# Patient Record
Sex: FEMALE | Race: WHITE | Employment: UNEMPLOYED | URBAN - METROPOLITAN AREA
[De-identification: names, ages, dates, MRNs, and addresses within clinical notes are randomized per-mention and may not be internally consistent; named-entity substitution may affect disease eponyms.]

---

## 2021-01-14 PROBLEM — O99.210 OBESITY IN PREGNANCY: Status: ACTIVE | Noted: 2021-01-14

## 2021-01-14 PROBLEM — O34.219 HISTORY OF CESAREAN SECTION COMPLICATING PREGNANCY: Status: ACTIVE | Noted: 2021-01-14

## 2021-01-14 PROBLEM — O09.529 ANTEPARTUM MULTIGRAVIDA OF ADVANCED MATERNAL AGE: Status: ACTIVE | Noted: 2021-01-14

## 2021-01-20 PROBLEM — O26.899 RH NEGATIVE STATE IN ANTEPARTUM PERIOD: Status: ACTIVE | Noted: 2021-01-20

## 2021-01-20 PROBLEM — Z67.91 RH NEGATIVE STATE IN ANTEPARTUM PERIOD: Status: ACTIVE | Noted: 2021-01-20

## 2021-03-15 PROBLEM — S92.352A CLOSED DISPLACED FRACTURE OF FIFTH METATARSAL BONE OF LEFT FOOT: Status: ACTIVE | Noted: 2021-03-15

## 2021-03-25 PROBLEM — Z34.92 PRENATAL CARE IN SECOND TRIMESTER: Status: ACTIVE | Noted: 2021-03-25

## 2021-03-25 PROBLEM — O43.192 MARGINAL INSERTION OF UMBILICAL CORD AFFECTING MANAGEMENT OF MOTHER IN SECOND TRIMESTER: Status: ACTIVE | Noted: 2021-03-25

## 2021-03-25 PROBLEM — O44.40 LOW-LYING PLACENTA: Status: ACTIVE | Noted: 2021-03-25

## 2021-04-28 PROBLEM — M72.2 PLANTAR FASCIA SYNDROME: Status: ACTIVE | Noted: 2021-04-28

## 2021-07-14 PROBLEM — O43.192 MARGINAL INSERTION OF UMBILICAL CORD AFFECTING MANAGEMENT OF MOTHER IN SECOND TRIMESTER: Status: RESOLVED | Noted: 2021-03-25 | Resolved: 2021-07-14

## 2021-07-14 PROBLEM — O44.40 LOW-LYING PLACENTA: Status: RESOLVED | Noted: 2021-03-25 | Resolved: 2021-07-14

## 2021-07-14 PROBLEM — Z34.93 PRENATAL CARE IN THIRD TRIMESTER: Status: RESOLVED | Noted: 2021-03-25 | Resolved: 2021-07-14

## 2021-07-14 PROBLEM — Z34.93 PRENATAL CARE IN THIRD TRIMESTER: Status: ACTIVE | Noted: 2021-03-25

## 2021-07-14 PROBLEM — Z34.90 PRENATAL CARE, ANTEPARTUM: Status: ACTIVE | Noted: 2021-07-14

## 2021-07-21 PROBLEM — O98.819 GROUP B STREPTOCOCCAL INFECTION DURING PREGNANCY: Status: ACTIVE | Noted: 2021-07-21

## 2021-07-21 PROBLEM — B95.1 GROUP B STREPTOCOCCAL INFECTION DURING PREGNANCY: Status: ACTIVE | Noted: 2021-07-21

## 2021-07-27 PROBLEM — O42.90 AMNIOTIC FLUID LEAKING: Status: ACTIVE | Noted: 2021-07-27

## 2021-07-29 PROBLEM — R03.0 TRANSIENT HYPERTENSION: Status: ACTIVE | Noted: 2021-07-29

## 2021-07-29 PROBLEM — Z30.2 REQUEST FOR STERILIZATION: Status: ACTIVE | Noted: 2021-07-29

## 2021-08-02 PROBLEM — O34.219 ENCOUNTER FOR MATERNAL CARE FOR SCAR FROM REPEAT CESAREAN DELIVERY: Status: ACTIVE | Noted: 2021-08-02

## 2021-09-08 PROBLEM — Z34.90 PRENATAL CARE, ANTEPARTUM: Status: RESOLVED | Noted: 2021-07-14 | Resolved: 2021-09-08

## 2021-09-08 PROBLEM — B95.1 GROUP B STREPTOCOCCAL INFECTION DURING PREGNANCY: Status: RESOLVED | Noted: 2021-07-21 | Resolved: 2021-09-08

## 2021-09-08 PROBLEM — Z67.91 RH NEGATIVE STATE IN ANTEPARTUM PERIOD: Status: RESOLVED | Noted: 2021-01-20 | Resolved: 2021-09-08

## 2021-09-08 PROBLEM — O34.219 ENCOUNTER FOR MATERNAL CARE FOR SCAR FROM REPEAT CESAREAN DELIVERY: Status: RESOLVED | Noted: 2021-08-02 | Resolved: 2021-09-08

## 2021-09-08 PROBLEM — O09.529 ANTEPARTUM MULTIGRAVIDA OF ADVANCED MATERNAL AGE: Status: RESOLVED | Noted: 2021-01-14 | Resolved: 2021-09-08

## 2021-09-08 PROBLEM — O26.899 RH NEGATIVE STATE IN ANTEPARTUM PERIOD: Status: RESOLVED | Noted: 2021-01-20 | Resolved: 2021-09-08

## 2021-09-08 PROBLEM — O98.819 GROUP B STREPTOCOCCAL INFECTION DURING PREGNANCY: Status: RESOLVED | Noted: 2021-07-21 | Resolved: 2021-09-08

## 2021-09-08 PROBLEM — O42.90 AMNIOTIC FLUID LEAKING: Status: RESOLVED | Noted: 2021-07-27 | Resolved: 2021-09-08

## 2021-09-08 PROBLEM — O34.219 HISTORY OF CESAREAN SECTION COMPLICATING PREGNANCY: Status: RESOLVED | Noted: 2021-01-14 | Resolved: 2021-09-08

## 2021-09-08 PROBLEM — O99.210 OBESITY IN PREGNANCY: Status: RESOLVED | Noted: 2021-01-14 | Resolved: 2021-09-08

## 2022-01-22 ENCOUNTER — E-VISIT (OUTPATIENT)
Dept: PRIMARY CARE CLINIC | Age: 37
End: 2022-01-22

## 2022-01-22 DIAGNOSIS — R05.9 COUGH: ICD-10-CM

## 2022-01-22 DIAGNOSIS — H92.09 OTALGIA, UNSPECIFIED LATERALITY: ICD-10-CM

## 2022-01-22 DIAGNOSIS — J01.90 ACUTE NON-RECURRENT SINUSITIS, UNSPECIFIED LOCATION: Primary | ICD-10-CM

## 2022-01-22 PROCEDURE — 99422 OL DIG E/M SVC 11-20 MIN: CPT | Performed by: NURSE PRACTITIONER

## 2022-01-22 RX ORDER — AMOXICILLIN AND CLAVULANATE POTASSIUM 875; 125 MG/1; MG/1
1 TABLET, FILM COATED ORAL 2 TIMES DAILY
Qty: 20 TABLET | Refills: 0 | Status: SHIPPED | OUTPATIENT
Start: 2022-01-22 | End: 2022-02-01

## 2022-01-22 ASSESSMENT — LIFESTYLE VARIABLES: SMOKING_STATUS: NO, I'VE NEVER SMOKED

## 2022-01-22 NOTE — PROGRESS NOTES
Reviewed questionnaire  Reviewed previous encounters, medications, allergies and history     Dx sinusitis, cough, otalgia     Plan  rx for Augmentin     1. Water: Drink 1/2 of body weight (lb) in ounces,  Up to 90 Ounces of water per day. This will loosen mucus in the head and chest & improve the weak feeling of dehydration, allow the body to get germ fighting resources to the infection. Half can be juice or sugar free powerade or garorate. Don't count drinks with caffeine or carbonation. Infants can have Pedialyte liquid or freezer pops. Avoid salt if you have high Blood Pressure, swelling in the feet or ankles or have heart problems. 2. Humidity: Humidify the air to 35-50% ( or until the windows fog over slightly).  Summer use of an air conditioner turned down too far and can result in dry air. Can use a humidifier, vaporizer, boil water on the stove or put a coffee can full of water on the heater vents. This will loosen mucus from infections and allergies. 3. Sleep: Get 8-10 hours a night and rest during the evening after work or school. If you have trouble sleeping, adults can take Melatonin 5mg up to 2 tabs at bedtime ( not for children or pregnant women). If Mono is suspected then sleep during 9PM to 9AM time span (if possible.)  4. Cough: Take cough medicines with Guaifenesin ( to loosen chest or head congestion) and Dextromethorphan ( to decrease excess cough). Robitussin D.M. Syrup every 4-6 hrs or Mucinex D. M. pills twice a day. Use the pediatric formulations for children over 6 months making sure they are alcohol & sugar free for children, pregnant women, and diabetics. 5. Pain And Fevers: Take Acetaminophen ( Tylenol) for fevers, aches, and headaches. 2-500 mg every 8 hours for adults. Appropriate doses at bedtime for children may help them sleep better. Ibuprofen may be used if not pregnant, but should be given with food to avoid nausea.  Avoid Ibuprofen if you have high blood pressure, CHF, or kidney problems. 6.Gargle: (DAY ONE OF SYMPTOMS) Gargle in the back of the throat with the head tilted back and to the sides with a strong mouthwash  ( Listerine or Scope) after meals and at bedtime at least 4 -5 times a day. This helps kill bacteria and viruses in the back of the throat and will shorten the duration and decrease the severity of your symptoms: sore throat, cough, ear popping,/ear pain, and possibly dizziness. 7. Smoking: Avoid smoking or exposure to second hand smoke. 8. Zinc: (DAY ONE OF SYMPTOMS)  Zinc lozenges such as Cold Steve (available most stores), or Basic ([employer] brand) will help shorten the duration and lessen symptoms such as sore throat, cough, nasal congestion, runny nose, and post nasal drip. Use 1 lozenge every 2-4 hours ( after meals if stomach is sensitive). Children can use 10-15 mg or less 3-4 times a day or Zinc lollypops. In pregnancy limit to 50-60 mg a day for 7 days as prenatals have Zinc also.   With diarrhea use zinc pills 50 mg 1/2 to 1 pill 2x/day. 9. Vitamins: Vitamin C 500 mg with breakfast and dinner. Children and pregnant women should drink citrus juices. This speeds healing and strengthens immune system. 10. Chest Symptoms: Vicks Vapor rub to the chest at bedtime. 11. Decongestants: Avoid all decongestants if you have high blood pressure. Safe to take if you do not have high blood pressure. Try all of the above starting with day 1 of symptoms. If Strep throat symptoms appear call to be seen in the office as soon as possible and don't gargle on that day. Newborns, infants, or anyone with earaches or influenza may need to be seen quickly. Adults with fevers over 103 degrees or shortness of breath should call the office immediately.   12. Nasal saline spray or rinse. There are many different ways to do this OTC.     I hope that you feel better.  If you do not feel better after treatment, please f/u with pcp.     Time spent 11-20 minutes

## 2022-04-07 PROBLEM — I51.4 MYOCARDITIS (HCC): Status: ACTIVE | Noted: 2022-04-07

## 2022-04-07 PROBLEM — R50.9 FEVER: Status: ACTIVE | Noted: 2022-04-07

## 2022-04-07 PROBLEM — B34.9 VIRAL SYNDROME: Status: ACTIVE | Noted: 2022-04-07

## 2022-06-10 PROBLEM — Z71.85 VACCINE COUNSELING: Status: ACTIVE | Noted: 2022-06-10

## 2022-06-10 PROBLEM — R03.0 TRANSIENT HYPERTENSION: Status: RESOLVED | Noted: 2021-07-29 | Resolved: 2022-06-10

## 2022-06-10 PROBLEM — E66.09 CLASS 1 OBESITY DUE TO EXCESS CALORIES WITHOUT SERIOUS COMORBIDITY WITH BODY MASS INDEX (BMI) OF 32.0 TO 32.9 IN ADULT: Status: ACTIVE | Noted: 2022-06-10
